# Patient Record
Sex: MALE | Race: AMERICAN INDIAN OR ALASKA NATIVE | NOT HISPANIC OR LATINO | Employment: FULL TIME | URBAN - METROPOLITAN AREA
[De-identification: names, ages, dates, MRNs, and addresses within clinical notes are randomized per-mention and may not be internally consistent; named-entity substitution may affect disease eponyms.]

---

## 2017-07-27 ENCOUNTER — OFFICE VISIT (OUTPATIENT)
Dept: OPTOMETRY | Facility: CLINIC | Age: 27
End: 2017-07-27
Payer: COMMERCIAL

## 2017-07-27 DIAGNOSIS — H31.012 MACULA SCAR OF POSTERIOR POLE OF LEFT EYE: Primary | ICD-10-CM

## 2017-07-27 PROCEDURE — 92004 COMPRE OPH EXAM NEW PT 1/>: CPT | Mod: S$GLB,,, | Performed by: OPTOMETRIST

## 2017-07-27 PROCEDURE — 99999 PR PBB SHADOW E&M-NEW PATIENT-LVL II: CPT | Mod: PBBFAC,,, | Performed by: OPTOMETRIST

## 2017-07-27 NOTE — LETTER
[facility]  [facility]ometry  [address]  [place] [address]  Phone: [phone]  Fax: [phone]   July 27, 2017    Alon Pablo  [address]  [place] [address]    Patient: Alon Pablo   MR Number: [id number]   YOB: 1990   Date of Visit: 7/27/2017       Dear Dr. Pablo :    Thank you for referring Alon Pablo to me for evaluation. Here is my assessment and plan of care:    Assessment:       Plan:       Below you can find relevant pieces of the exam. If you have questions, please do not hesitate to call me. I look forward to following Alon Pablo along with you.    Sincerely,        Kiah Pelletier, OD       CC  No Recipients           {Add Ophthalmology Exam Information :[id number]}

## 2017-07-27 NOTE — LETTER
[facility]  Optometry  [address]  [place] [address]  Phone: [phone]  Fax: [phone]   July 27, 2017    Alon Pablo  [address]  [place] [address]    Patient: Alon Pablo   MR Number: [id number]   YOB: 1990   Date of Visit: 7/27/2017       Dear Dr. Pablo :    Thank you for referring Alon Pablo to me for evaluation. 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 Monitor. Not correctable with glasses.      Below you can find relevant pieces of the exam. If you have questions, please do not hesitate to call me. I look forward to following Alon Pablo along with you.    Sincerely,        Kiah Vo, OD         CC  No Recipients             Base Eye Exam     Visual Acuity (Snellen - Linear)       Right Left    Dist sc 20/20 20/400    Dist ph sc  20/200    Near sc J1 J2             Visual Fields (Counting fingers)       Right Left     Full Full            Additional Tests     Color       Right Left    [place] 9/9 9/9               Refraction     Manifest Refraction       Sphere Cylinder Dist VA    Right +0.25 Sphere 20/20    Left [place]  20/400

## 2017-07-27 NOTE — PROGRESS NOTES
Subjective:       Patient ID: Alon Pablo is a 26 y.o. male      Chief Complaint   Patient presents with    Concerns About Ocular Health     History of Present Illness  Dls: 2 days [place] [facility]     Pt states [employer] is requesting vision wavier. Pt states blurry vision os just   realized his vision was poor at his last physical and wants a 2nd opinion.   Seen 2 days ago and diagnosed with macular scar OS.    Eye meds:   None       Assessment/Plan:     1. 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 Decreased VA due to scarring, not correctable with spectacle Rx. Monitor.     Return if symptoms worsen or fail to improve.